# Patient Record
Sex: MALE | URBAN - METROPOLITAN AREA
[De-identification: names, ages, dates, MRNs, and addresses within clinical notes are randomized per-mention and may not be internally consistent; named-entity substitution may affect disease eponyms.]

---

## 2023-06-15 ENCOUNTER — TELEPHONE (OUTPATIENT)
Dept: CARDIOLOGY CLINIC | Age: 68
End: 2023-06-15

## 2023-06-19 NOTE — TELEPHONE ENCOUNTER
Left another msg on the number listed below for pt to call and schedule for himself and wife. Also LM on wife's # as well again.

## 2023-06-21 NOTE — TELEPHONE ENCOUNTER
Left 3rd/final msg on pt's # and also wife July's number to call and schedule. Called Dr Spears's office and was sent to voicemail.   LM adv 3 attempts made but no return calls